# Patient Record
Sex: MALE | Race: BLACK OR AFRICAN AMERICAN | NOT HISPANIC OR LATINO | URBAN - METROPOLITAN AREA
[De-identification: names, ages, dates, MRNs, and addresses within clinical notes are randomized per-mention and may not be internally consistent; named-entity substitution may affect disease eponyms.]

---

## 2018-04-22 ENCOUNTER — HOSPITAL ENCOUNTER (EMERGENCY)
Facility: HOSPITAL | Age: 33
Discharge: HOME | End: 2018-04-22
Attending: EMERGENCY MEDICINE

## 2018-04-22 VITALS
TEMPERATURE: 98 F | OXYGEN SATURATION: 98 % | RESPIRATION RATE: 16 BRPM | BODY MASS INDEX: 23.7 KG/M2 | SYSTOLIC BLOOD PRESSURE: 122 MMHG | DIASTOLIC BLOOD PRESSURE: 72 MMHG | WEIGHT: 175 LBS | HEIGHT: 72 IN | HEART RATE: 68 BPM

## 2018-04-22 DIAGNOSIS — R46.2 BIZARRE BEHAVIOR: ICD-10-CM

## 2018-04-22 DIAGNOSIS — R36.9 PENILE DISCHARGE: Primary | ICD-10-CM

## 2018-04-22 PROCEDURE — 99282 EMERGENCY DEPT VISIT SF MDM: CPT

## 2018-04-22 RX ORDER — CEFTRIAXONE 250 MG/1
250 INJECTION, POWDER, FOR SOLUTION INTRAMUSCULAR; INTRAVENOUS ONCE
Status: DISCONTINUED | OUTPATIENT
Start: 2018-04-22 | End: 2018-04-22 | Stop reason: HOSPADM

## 2018-04-22 RX ORDER — AZITHROMYCIN 250 MG/1
1000 TABLET, FILM COATED ORAL
Status: DISCONTINUED | OUTPATIENT
Start: 2018-04-22 | End: 2018-04-22 | Stop reason: HOSPADM

## 2018-04-23 NOTE — ED NOTES
Patient came out of room and ran to door and out of ED doors.  Attempt on asking him to come back but patient continued out the door.

## 2018-04-23 NOTE — ED NOTES
Pt. In room playing on the computer- advised pt. Not to touch facilities computer - pt. States he did not touch the computer

## 2018-04-23 NOTE — ED NOTES
"Went into room and computer on you tube playing music- pt. Sitting behind curtain- states "i did not do it"  "

## 2018-04-23 NOTE — ED PROVIDER NOTES
"Encounter Date: 4/22/2018    SCRIBE #1 NOTE: I, Iraida Anguiano, am scribing for, and in the presence of,  Omar Cool PA-C. I have scribed the following portions of the note - Other sections scribed: ROS and HPI.       History     Chief Complaint   Patient presents with    Penile Discharge     Pt c/o penile "blue" discharge x4 yrs. denies pain or swelling . States "I need some penicllin, yall got cough drops." Pt presents with bizarre behavior      CC: Penile Discharge    HPI: This 33 y.o. male with no PMHx presents to the ED complaining of 4 yr hx of "blue" penile discharge. He notes he was recommended by "police doctor" to get Penicillin for his sx. He also reports dysuria. He lives in [place] by himself. Denies urine frequency, penile pain, testicular pain or any other related sx. No prior medical intervention. Notes 4 16 oz beers PTA, but otherwise denies drug use.       The history is provided by the patient. No  was used.     Review of patient's allergies indicates:  No Known Allergies  History reviewed. No pertinent past medical history.  History reviewed. No pertinent surgical history.  No family history on file.  Social History   Substance Use Topics    Smoking status: Never Smoker    Smokeless tobacco: Current User     Types: Chew    Alcohol use Yes     Review of Systems   Constitutional: Negative for chills and fever.   Gastrointestinal: Negative for nausea and vomiting.   Genitourinary: Positive for discharge ("blue") and dysuria. Negative for frequency, hematuria, penile pain and testicular pain.   Musculoskeletal: Negative for back pain.   Skin: Negative for rash.   Psychiatric/Behavioral: Negative for hallucinations and suicidal ideas.       Physical Exam     Initial Vitals [04/22/18 2019]   BP Pulse Resp Temp SpO2   122/72 68 16 98.1 °F (36.7 °C) 98 %      MAP       88.67         Physical Exam    Nursing note and vitals reviewed.  Constitutional: He appears well-developed and " "well-nourished. He is not diaphoretic. No distress.   HENT:   Head: Normocephalic and atraumatic.   Nose: Nose normal.   Eyes: Conjunctivae and EOM are normal. Right eye exhibits no discharge. Left eye exhibits no discharge.   Neck: Normal range of motion. No tracheal deviation present. No JVD present.   Cardiovascular: Normal rate, regular rhythm and normal heart sounds. Exam reveals no friction rub.    No murmur heard.  Pulmonary/Chest: Breath sounds normal. No stridor. No respiratory distress. He has no wheezes. He has no rhonchi. He has no rales. He exhibits no tenderness.   Abdominal: Soft. He exhibits no distension. There is no tenderness. There is no rigidity, no rebound, no guarding, no CVA tenderness, no tenderness at McBurney's point and negative Thomas's sign.   Genitourinary: Testes normal. Right testis shows no mass, no swelling and no tenderness. Left testis shows no mass, no swelling and no tenderness. Uncircumcised. No penile erythema or penile tenderness. No discharge found.   Genitourinary Comments: Wearing 3 pairs of pants. No discharge.    Musculoskeletal: Normal range of motion.   Neurological: He is alert and oriented to person, place, and time.   Skin: Skin is warm and dry. No rash and no abscess noted. No erythema. No pallor.   Psychiatric: His affect is not angry. His speech is not tangential. He is not aggressive and not actively hallucinating. Cognition and memory are not impaired. He expresses no homicidal and no suicidal ideation.   Patient has flights of ideas and tangential speech.          ED Course   Procedures  Labs Reviewed   CULTURE, URINE   C. TRACHOMATIS/N. GONORRHOEAE BY AMP DNA   URINALYSIS             Medical Decision Making:   History:   Old Medical Records: I decided to obtain old medical records.  Initial Assessment:   32 yo M requesting PCN for chronic "blue" penile discharge and dysuria. Otherwise asymptomatic. Normal  and abdominal exam. Exhibiting bizarre behavior " with no SI/HI.   Clinical Tests:   Lab Tests: Ordered and Reviewed  ED Management:  Given bizarre behavior, I request Dr. Quintero to evaluate patient. Does not appear clinically intoxicated denies SI/HI. Patient does not meet criteria for PEC at this time, but I wonder if patient may have underlying psychiatric Hx. No past records, obtainable outside records, or family members to gather information.     Will treat for GC coverage given his presenting complaint. Patient elopes before treatment and UA results.   Other:   I have discussed this case with another health care provider.       <> Summary of the Discussion: Case discussed with Dr. Quintero who also evaluated the patient and is in agreement with my assessment and plan.             Scribe Attestation:   Scribe #1: I performed the above scribed service and the documentation accurately describes the services I performed. I attest to the accuracy of the note.    Attending Attestation:           Physician Attestation for Scribe:  Physician Attestation Statement for Scribe #1: I, Omar Cool PA-C, reviewed documentation, as scribed by Iraida Anguiano in my presence, and it is both accurate and complete.                    Clinical Impression:   The primary encounter diagnosis was Penile discharge. A diagnosis of Bizarre behavior was also pertinent to this visit.    Disposition:   Disposition: Eloped  Condition: Fair                        Omar Cool PA-C  04/22/18 [id number]

## 2018-04-23 NOTE — ED NOTES
Pt. Noted walking fast and went out the ED door- provider and staff called for pt. However he did not turn around and kept waking.

## 2018-04-23 NOTE — ED TRIAGE NOTES
"Patient arrived to ED with c/o "blue" discharge from penis that he has had for "years" and feels he needs antibiotics.  When questioned why he believes he needs antibiotics after so many years of discharge, patient stated, "because its turned into swag sauce".  Unable to get information into patients symptoms secondary to flight of ideas from patient and bizarre behaviors.  "

## 2018-04-23 NOTE — ED NOTES
Pt. In room standing next to the computer - watching youtXylo- nurse advised pt. NOT to touch computer, again.